# Patient Record
Sex: FEMALE | Race: BLACK OR AFRICAN AMERICAN | Employment: FULL TIME | URBAN - METROPOLITAN AREA
[De-identification: names, ages, dates, MRNs, and addresses within clinical notes are randomized per-mention and may not be internally consistent; named-entity substitution may affect disease eponyms.]

---

## 2018-03-20 ENCOUNTER — APPOINTMENT (OUTPATIENT)
Dept: CT IMAGING | Facility: CLINIC | Age: 43
End: 2018-03-20
Attending: PHYSICIAN ASSISTANT
Payer: OTHER GOVERNMENT

## 2018-03-20 ENCOUNTER — HOSPITAL ENCOUNTER (EMERGENCY)
Facility: CLINIC | Age: 43
Discharge: HOME | End: 2018-03-20
Attending: EMERGENCY MEDICINE | Admitting: EMERGENCY MEDICINE
Payer: OTHER GOVERNMENT

## 2018-03-20 VITALS
OXYGEN SATURATION: 99 % | DIASTOLIC BLOOD PRESSURE: 89 MMHG | HEART RATE: 76 BPM | RESPIRATION RATE: 22 BRPM | SYSTOLIC BLOOD PRESSURE: 132 MMHG | WEIGHT: 269 LBS | TEMPERATURE: 98.6 F

## 2018-03-20 DIAGNOSIS — R06.00 DYSPNEA, UNSPECIFIED TYPE: ICD-10-CM

## 2018-03-20 DIAGNOSIS — R79.89 ELEVATED D-DIMER: ICD-10-CM

## 2018-03-20 LAB
ANION GAP SERPL CALCULATED.3IONS-SCNC: 6 MMOL/L (ref 3–14)
BASOPHILS # BLD AUTO: 0 10E9/L (ref 0–0.2)
BASOPHILS NFR BLD AUTO: 0.6 %
BUN SERPL-MCNC: 11 MG/DL (ref 7–30)
CALCIUM SERPL-MCNC: 9.5 MG/DL (ref 8.5–10.1)
CHLORIDE SERPL-SCNC: 103 MMOL/L (ref 94–109)
CO2 SERPL-SCNC: 28 MMOL/L (ref 20–32)
CREAT SERPL-MCNC: 0.82 MG/DL (ref 0.52–1.04)
DIFFERENTIAL METHOD BLD: ABNORMAL
EOSINOPHIL # BLD AUTO: 0.1 10E9/L (ref 0–0.7)
EOSINOPHIL NFR BLD AUTO: 0.9 %
ERYTHROCYTE [DISTWIDTH] IN BLOOD BY AUTOMATED COUNT: 14 % (ref 10–15)
GFR SERPL CREATININE-BSD FRML MDRD: 77 ML/MIN/1.7M2
GLUCOSE SERPL-MCNC: 96 MG/DL (ref 70–99)
HCG SERPL QL: NEGATIVE
HCT VFR BLD AUTO: 40.7 % (ref 35–47)
HGB BLD-MCNC: 12.9 G/DL (ref 11.7–15.7)
IMM GRANULOCYTES # BLD: 0 10E9/L (ref 0–0.4)
IMM GRANULOCYTES NFR BLD: 0.3 %
LYMPHOCYTES # BLD AUTO: 2.3 10E9/L (ref 0.8–5.3)
LYMPHOCYTES NFR BLD AUTO: 32.9 %
MCH RBC QN AUTO: 26.4 PG (ref 26.5–33)
MCHC RBC AUTO-ENTMCNC: 31.7 G/DL (ref 31.5–36.5)
MCV RBC AUTO: 83 FL (ref 78–100)
MONOCYTES # BLD AUTO: 0.6 10E9/L (ref 0–1.3)
MONOCYTES NFR BLD AUTO: 8.8 %
NEUTROPHILS # BLD AUTO: 4 10E9/L (ref 1.6–8.3)
NEUTROPHILS NFR BLD AUTO: 56.5 %
NRBC # BLD AUTO: 0 10*3/UL
NRBC BLD AUTO-RTO: 0 /100
PLATELET # BLD AUTO: 223 10E9/L (ref 150–450)
POTASSIUM SERPL-SCNC: 4.2 MMOL/L (ref 3.4–5.3)
RBC # BLD AUTO: 4.89 10E12/L (ref 3.8–5.2)
SODIUM SERPL-SCNC: 137 MMOL/L (ref 133–144)
WBC # BLD AUTO: 7 10E9/L (ref 4–11)

## 2018-03-20 PROCEDURE — 84703 CHORIONIC GONADOTROPIN ASSAY: CPT | Performed by: PHYSICIAN ASSISTANT

## 2018-03-20 PROCEDURE — 99285 EMERGENCY DEPT VISIT HI MDM: CPT | Mod: 25

## 2018-03-20 PROCEDURE — 25000128 H RX IP 250 OP 636: Performed by: EMERGENCY MEDICINE

## 2018-03-20 PROCEDURE — 80048 BASIC METABOLIC PNL TOTAL CA: CPT | Performed by: PHYSICIAN ASSISTANT

## 2018-03-20 PROCEDURE — 96374 THER/PROPH/DIAG INJ IV PUSH: CPT | Mod: 59

## 2018-03-20 PROCEDURE — 93005 ELECTROCARDIOGRAM TRACING: CPT

## 2018-03-20 PROCEDURE — 85025 COMPLETE CBC W/AUTO DIFF WBC: CPT | Performed by: PHYSICIAN ASSISTANT

## 2018-03-20 PROCEDURE — 25000128 H RX IP 250 OP 636: Performed by: PHYSICIAN ASSISTANT

## 2018-03-20 PROCEDURE — 71260 CT THORAX DX C+: CPT

## 2018-03-20 RX ORDER — IOPAMIDOL 755 MG/ML
500 INJECTION, SOLUTION INTRAVASCULAR ONCE
Status: COMPLETED | OUTPATIENT
Start: 2018-03-20 | End: 2018-03-20

## 2018-03-20 RX ORDER — ONDANSETRON 2 MG/ML
4 INJECTION INTRAMUSCULAR; INTRAVENOUS ONCE
Status: COMPLETED | OUTPATIENT
Start: 2018-03-20 | End: 2018-03-20

## 2018-03-20 RX ADMIN — IOPAMIDOL 67 ML: 755 INJECTION, SOLUTION INTRAVENOUS at 19:35

## 2018-03-20 RX ADMIN — ONDANSETRON 4 MG: 2 INJECTION INTRAMUSCULAR; INTRAVENOUS at 20:05

## 2018-03-20 RX ADMIN — SODIUM CHLORIDE 81 ML: 9 INJECTION, SOLUTION INTRAVENOUS at 19:35

## 2018-03-20 ASSESSMENT — ENCOUNTER SYMPTOMS
COUGH: 0
CHEST TIGHTNESS: 0
DIAPHORESIS: 0
STRIDOR: 0
SHORTNESS OF BREATH: 1
NAUSEA: 1
WHEEZING: 0

## 2018-03-20 NOTE — ED PROVIDER NOTES
Emergency Department Attending Supervision Note  3/20/2018  5:44 PM      I evaluated this patient in conjunction with Latia Moise.      Briefly, the patient presented from clinic where she went today for routine physical where she was found to have an elevated d-dimer which was checked by PCP because the patient stated that she had SOB.      On my exam, patient is obese, but appears comfortable; lungs clear; no pleuritic chest pain; no lower extremity swelling/edema.    Results:  ECG:  Indication: abnormal labs, shortness of breath  Time: 1754  Vent. Rate 75 bpm. TX interval 174. QRS duration 72. QT/QTc 370/413. P-R-T axis 43 -15 32. Normal sinus rhythm. Normal ECG. Read time: 1800.    Imaging:  Radiographic findings were communicated with the patient who voiced understanding of the findings.    CT Abdomen/Pelvis w/o IV contrast-stone protocol:   1. No visualized pulmonary embolus.  2. No evidence of active pulmonary disease. As per radiology.     Laboratory:  BMP: All WNL (Creatinine: 0.82)  CBC: WBC: 7.0, HGB: 12.9, PLT: 223  HCG qualitative: Negative    Interventions:  2005 Zofran, 4 mg, IV injection    ED course:  Nursing notes and vitals reviewed.  I performed an exam of the patient as documented above.     IV inserted. Medicine administered as documented above. Blood drawn. This was sent to the lab for further testing, results above.    The patient was sent for a chest CT while in the emergency department, findings above.     Findings and plan explained to the Patient. Patient discharged home with instructions regarding supportive care, medications, and reasons to return. The importance of close follow-up was reviewed.     My impression is deconditioning and obesity.        Diagnosis:    SOB      Bryson Murphy MD Egger, Thomas W, MD  03/21/18 0811

## 2018-03-20 NOTE — ED AVS SNAPSHOT
[facility] Emergency Department    [address]    [facility] [address]    Phone:  [phone]    Fax:  [phone]                                       Teresa Sam   MRN: [id number]    Department:  [facility] Emergency Department   Date of Visit:  3/20/2018           Patient Information     Date Of Birth          1975        Your diagnoses for this visit were:     Dyspnea, unspecified type     Elevated d-dimer        You were seen by Bryson Murphy MD.      Follow-up Information     Follow up with Jenny Joseph NP In 3 days.    Specialty:  Nurse Practitioner    Why:  As needed    Contact information:    [facility] [place]  [address] [facility] [address] [phone]          Follow up with [facility] Emergency Department.    Specialty:  EMERGENCY MEDICINE    Why:  If symptoms worsen    Contact information:    [address]  [facility] [id number] [phone]        Discharge Instructions         Shortness of Breath (Dyspnea)  Shortness of breath is the feeling that you can't catch your breath or get enough air. It is also known as dyspnea.  Dyspnea can be caused by many different conditions. They include:    Acute asthma attack.    Worsening of chronic lung diseases such as chronic bronchitis and emphysema.    Heart failure. This is when weak heart muscle allows extra fluid to collect in the lungs.    Panic attacks or anxiety. Fear can cause rapid breathing (hyperventilation).    Pneumonia, or an infection in the lung tissue.    Exposure to toxic substances, fumes, smoke, or certain medicines.    Blood clot in the lung (pulmonary embolism). This is often from a piece of blood clot in a deep vein of the leg (deep vein thrombosis) that breaks off and travels to the lungs.    Heart attack or heart-related chest pain (angina).    Anemia.    Collapsed lung  (pneumothorax).    Dehydration.    Pregnancy.  Based on your visit today, the exact cause of your shortness of breath is not certain. Your tests don t show any of the serious causes of dyspnea. You may need other tests to find out if you have a serious problem. It s important to watch for any new symptoms or symptoms that get worse. Follow up with your healthcare provider as directed.  Home care  Follow these tips to take care of yourself at home:    When your symptoms are better, go back to your usual activities.    If you smoke, you should stop. Join a quit-smoking program or ask your healthcare provider for help.    Eat a healthy diet and get plenty of sleep.    Get regular exercise. Talk with your healthcare provider before starting to exercise, especially if you have other medical problems.    Cut down on the amount of caffeine and stimulants you consume.  Follow-up care  Follow up with your healthcare provider, or as advised.  If tests were done, you will be told if your treatment needs to be changed. You can call as directed for the results.  (Note: If an X-ray was taken, a specialist will review it. You will be notified of any new findings that may affect your care.)  Call 911 or get immediate medical care  Shortness of breath may be a sign of a serious medical problem. For example, it may be a problem with your heart or lungs. Call 911 if you have worsening shortness of breath or trouble breathing, especially with any of the symptoms below:    You are confused or it s difficult to wake you.    You faint or lose consciousness.    You have a fast heartbeat, or your heartbeat is irregular.    You are coughing up blood.    You have pain in your chest, arm, shoulder, neck, or upper back.    You break out in a sweat.  When to seek medical advice  Call your healthcare provider right away if any of these occur:    Slight shortness of breath or wheezing    Redness, pain or swelling in your leg, arm, or other body  area    Swelling in both legs or ankles    Fast weight gain    Dizziness or weakness    Fever of 100.4 F (38 C) or higher, or as directed by your healthcare provider        24 Hour Appointment Hotline       To make an appointment at any [facility] clinic, call [phone] ([phone]). If you don't have a family doctor or clinic, we will help you find one. [facility] are conveniently located to serve the needs of you and your family.             Review of your medicines      Our records show that you are taking the medicines listed below. If these are incorrect, please call your family doctor or clinic.        Dose / Directions Last dose taken    ASPIRIN PO   Dose:  81 mg        Take 81 mg by mouth daily   Refills:  0        METFORMIN HCL PO   Dose:  1000 mg        Take 1,000 mg by mouth 2 times daily (with meals)   Refills:  0        RANITIDINE HCL PO        Refills:  0        ZOLOFT PO   Dose:  150 mg        Take 150 mg by mouth daily   Refills:  0                Procedures and tests performed during your visit     Basic metabolic panel    CBC + differential    CT Chest Pulmonary Embolism w Contrast    EKG 12 lead    HCG qualitative      Orders Needing Specimen Collection     None      Pending Results     Date and Time Order Name Status Description    3/20/2018 1746 EKG 12 lead Preliminary             Pending Culture Results     No orders found from 3/18/2018 to 3/21/2018.            Pending Results Instructions     If you had any lab results that were not finalized at the time of your Discharge, you can call the ED Lab Result RN at [phone]. You will be contacted by this team for any positive Lab results or changes in treatment. The nurses are available 7 days a week from 10A to 6:30P.  You can leave a message 24 hours per day and they will return your call.        Test Results From Your Hospital Stay        3/20/2018  6:19 PM      Component Results     Component Value Ref Range & Units Status     Sodium 137 133 - 144 mmol/L Final    Potassium 4.2 3.4 - 5.3 mmol/L Final    Chloride 103 94 - 109 mmol/L Final    Carbon Dioxide 28 20 - 32 mmol/L Final    Anion Gap 6 3 - 14 mmol/L Final    Glucose 96 70 - 99 mg/dL Final    Urea Nitrogen 11 7 - 30 mg/dL Final    Creatinine 0.82 0.52 - 1.04 mg/dL Final    GFR Estimate 77 >60 mL/min/1.7m2 Final    Non  GFR Calc    GFR Estimate If Black >90 >60 mL/min/1.7m2 Final    African American GFR Calc    Calcium 9.5 8.5 - 10.1 mg/dL Final         3/20/2018  6:02 PM      Component Results     Component Value Ref Range & Units Status    WBC 7.0 4.0 - 11.0 10e9/L Final    RBC Count 4.89 3.8 - 5.2 10e12/L Final    Hemoglobin 12.9 11.7 - 15.7 g/dL Final    Hematocrit 40.7 35.0 - 47.0 % Final    MCV 83 78 - 100 fl Final    MCH 26.4 (L) 26.5 - 33.0 pg Final    MCHC 31.7 31.5 - 36.5 g/dL Final    RDW 14.0 10.0 - 15.0 % Final    Platelet Count 223 150 - 450 10e9/L Final    Diff Method Automated Method  Final    % Neutrophils 56.5 % Final    % Lymphocytes 32.9 % Final    % Monocytes 8.8 % Final    % Eosinophils 0.9 % Final    % Basophils 0.6 % Final    % Immature Granulocytes 0.3 % Final    Nucleated RBCs 0 0 /100 Final    Absolute Neutrophil 4.0 1.6 - 8.3 10e9/L Final    Absolute Lymphocytes 2.3 0.8 - 5.3 10e9/L Final    Absolute Monocytes 0.6 0.0 - 1.3 10e9/L Final    Absolute Eosinophils 0.1 0.0 - 0.7 10e9/L Final    Absolute Basophils 0.0 0.0 - 0.2 10e9/L Final    Abs Immature Granulocytes 0.0 0 - 0.4 10e9/L Final    Absolute Nucleated RBC 0.0  Final         3/20/2018  7:56 PM      Narrative     CT CHEST WITH CONTRAST   3/20/2018 7:43 PM     HISTORY: Dyspnea. Chest pain. Elevated D-dimer.    COMPARISON: None.    TECHNIQUE: Following the uneventful administration of 67 mL Isovue-370  intravenous contrast, helical sections were acquired through the lungs  according to the pulmonary embolism protocol. Coronal reconstructions  were generated. Radiation dose for this scan  was reduced using  automated exposure control, adjustment of the mA and/or kV according  to the patient's size, or iterative reconstruction technique.    FINDINGS: No visualized pulmonary embolus. The thoracic aorta is  normal in caliber without dissection.    Subsegmental atelectasis scattered within both lungs. The lungs are  otherwise clear. No pleural or pericardial effusion. No enlarged lymph  nodes in the chest. Small hiatal hernia.    Scan through the upper abdomen is unremarkable.        Impression     IMPRESSION:   1. No visualized pulmonary embolus.  2. No evidence of active pulmonary disease.    JENIFFER JERNIGAN MD         3/20/2018  7:01 PM      Component Results     Component Value Ref Range & Units Status    HCG Qualitative Serum Negative NEG^Negative Final    This test is for screening purposes.  Results should be interpreted along with   the clinical picture.  Confirmation testing is available if warranted by   ordering BBA090, HCG Quantitative Pregnancy.                  Clinical Quality Measure: Blood Pressure Screening     Your blood pressure was checked while you were in the emergency department today. The last reading we obtained was  BP: 148/88 . Please read the guidelines below about what these numbers mean and what you should do about them.  If your systolic blood pressure (the top number) is less than 120 and your diastolic blood pressure (the bottom number) is less than 80, then your blood pressure is normal. There is nothing more that you need to do about it.  If your systolic blood pressure (the top number) is 120-139 or your diastolic blood pressure (the bottom number) is 80-89, your blood pressure may be higher than it should be. You should have your blood pressure rechecked within a year by a primary care provider.  If your systolic blood pressure (the top number) is 140 or greater or your diastolic blood pressure (the bottom number) is 90 or greater, you may have high blood pressure.  "High blood pressure is treatable, but if left untreated over time it can put you at risk for heart attack, stroke, or kidney failure. You should have your blood pressure rechecked by a primary care provider within the next 4 weeks.  If your provider in the emergency department today gave you specific instructions to follow-up with your doctor or provider even sooner than that, you should follow that instruction and not wait for up to 4 weeks for your follow-up visit.        Thank you for choosing [facility]       Thank you for choosing [facility] for your care. Our goal is always to provide you with excellent care. Hearing back from our patients is one way we can continue to improve our services. Please take a few minutes to complete the written survey that you may receive in the mail after you visit with us. Thank you!        [facility] Information     [facility] lets you send messages to your doctor, view your test results, renew your prescriptions, schedule appointments and more. To sign up, go to [email or web address] . Click on \"Log in\" on the left side of the screen, which will take you to the Welcome page. Then click on \"Sign up Now\" on the right side of the page.     You will be asked to enter the access code listed below, as well as some personal information. Please follow the directions to create your username and password.     Your access code is: [email or web address]  Expires: 2018  8:10 PM     Your access code will  in 90 days. If you need help or a new code, please call your [facility] clinic or [phone].        Care EveryWhere ID     This is your Care EveryWhere ID. This could be used by other organizations to access your [facility] medical records  [id number]        Equal Access to Services     BRYCE STORY : Hadaixa Stephens, aron covarrubias, marsha levy. So [facility] [phone].    ATENCIÓN: Si habla español, tiene a montelongo disposición " servicios gratuitos de asistencia lingüística. Edd viera [phone].    We comply with applicable federal civil rights laws and [place] laws. We do not discriminate on the basis of race, color, national origin, age, disability, sex, sexual orientation, or gender identity.            After Visit Summary       This is your record. Keep this with you and show to your community pharmacist(s) and doctor(s) at your next visit.

## 2018-03-20 NOTE — ED AVS SNAPSHOT
[facility] Emergency Department    [address]    [facility] [address]    Phone:  [phone]    Fax:  [phone]                                       Teresa Sam   MRN: [id number]    Department:  [facility] Emergency Department   Date of Visit:  3/20/2018           After Visit Summary Signature Page     I have received my discharge instructions, and my questions have been answered. I have discussed any challenges I see with this plan with the nurse or doctor.    ..........................................................................................................................................  Patient/Patient Representative Signature      ..........................................................................................................................................  Patient Representative Print Name and Relationship to Patient    ..................................................               ................................................  Date                                            Time    ..........................................................................................................................................  Reviewed by Signature/Title    ...................................................              ..............................................  Date                                                            Time

## 2018-03-20 NOTE — ED PROVIDER NOTES
History     Chief Complaint:  Abnormal Labs and Shortness of Breath    HPI   Teresa Sam is a 42 year old female who presents with a one-week history of shortness of breath. The patient mentions that the shortness of breath happens mostly at rest, very intermittently, and rarely during exertion, such as when she is at the gym. She denies any chest pain or pain/swelling in her legs. The patient was seen by her primary care provider yesterday for a yearly physical, where she told the provider about her shortness of breath. The provider sunil a d-dimer, which was elevated at 0.55, and the patient was called today and told to go to the emergency department. She also admits to a slight amount of nausea without vomiting.     PE/DVT RISK FACTORS:   Sex: Female  Hormones: Negative  Tobacco: Negative  Cancer: Negative  Travel: Negative  Surgery: Negative  Other immobilization: Negative  Personal history: Positive - DVT in 2013  Family history: Negative.     Lab results from [facility], 03/19/2018:   Hgb A1c: 5.6  D-Dimer: 0.55 (H)    Allergies:  Latex     Medications:    Zoloft  Ranitdine  Aspirin  Metformin    Past Medical History:    Arthritis   Depression  GERD  PCOS    Past Surgical History:    cholecystectomy  Gyn surgery    Family History:    No past pertinent family history.    Social History:  Negative for tobacco use.  Positive for alcohol use: occasionally     Review of Systems   Constitutional: Negative for diaphoresis.   Respiratory: Positive for shortness of breath. Negative for cough, chest tightness, wheezing and stridor.    Gastrointestinal: Positive for nausea.   Neurological: Negative for syncope.   All other systems reviewed and are negative.    Physical Exam   Vitals:   Patient Vitals for the past 24 hrs:   BP Temp Temp src Pulse Heart Rate Resp SpO2 Weight   03/20/18 2020 132/89 - - 76 - - 99 % -   03/20/18 1830 - - - - - - 98 % -   03/20/18 1815 - - - - - - 97 % -   03/20/18 1734 - - -  - - - 98 % -   03/20/18 1730 - - - - - - 98 % -   03/20/18 1715 - - - - - - 98 % -   03/20/18 1710 148/88 98.6  F (37  C) Oral 78 78 22 99 % 122 kg (269 lb)       Physical Exam  General: Alert and interactive. Patient appears comfortable.   Eyes: The pupils are equal and round. EOMs intact. No scleral icterus.     Neck:Trachea is in the midline.        CV: Regular rate and rhythm. S1 and S2 normal without murmur, click, gallop or rub. No obvious swelling in calves. No tenderness to palpation of calves, bilaterally.   Resp: Breath sounds are clear bilaterally, without rhonchi, wheezes, rales. Non-labored, no retractions or accessory muscle use.      GI: Abdomen is soft without distension. No tenderness to palpation. No peritoneal signs.    MS: Moving all extremities well.   Skin:  Warm and dry. No rash or lesions noted.  Neuro:  Alert and oriented x 3. GCS 15.    Psych: Awake. Alert.  Normal affect. Appropriate interactions.  Lymph: No anterior or posterior cervical lymphadenopathy noted.    Emergency Department Course   ECG:  Indication: abnormal labs, shortness of breath  Time: 1754  Vent. Rate 75 bpm. MT interval 174. QRS duration 72. QT/QTc 370/413. P-R-T axis 43 -15 32. Normal sinus rhythm. Normal ECG. Read time: 1800.    Imaging:  Radiographic findings were communicated with the patient who voiced understanding of the findings.    CT Abdomen/Pelvis w/o IV contrast-stone protocol:   1. No visualized pulmonary embolus.  2. No evidence of active pulmonary disease. As per radiology.     Laboratory:  BMP: All WNL (Creatinine: 0.82)  CBC: WBC: 7.0, HGB: 12.9, PLT: 223  HCG qualitative: Negative    Interventions:  2005 Zofran, 4 mg, IV injection    Emergency Department Course:  Nursing notes and vitals reviewed.  I performed an exam of the patient as documented above.     IV inserted. Medicine administered as documented above. Blood drawn. This was sent to the lab for further testing, results above.    EKG obtained in  the ED, see results above.     The patient was sent for a chest CT-PE protocol while in the emergency department, findings above.     2001 I rechecked the patient and discussed the results of her workup thus far.     Findings and plan explained to the Patient. Patient discharged home with instructions regarding supportive care, medications, and reasons to return. The importance of close follow-up was reviewed.    I personally reviewed the laboratory results with the Patient and answered all related questions prior to discharge.     Impression & Plan    Medical Decision Making: Teresa Sam is a 42 year old female who presents for evaluation of dyspnea after being sent from the clinic for elevation of a d-dimer.  On exam, patient is pleasant, without signs of respiratory distress or swelling in her legs. Her vitals were stable initially, except for an initial respiratory rate of 22. I considered a broad differential of her dyspnea, including ACS, PE, asthma, pneumonia, and pneumothorax, among others.     ACS is unlikely given lack of chest pain. Initial EKG showed normal sinus rhythm, without ST segment elevation or depression to suggest ischemia. CT PE showed no signs of intrapulmonary pathology, including PE. I doubt asthma, giving lack of history and no wheezing on exam. Pneumonia and pneumothorax would have been visible on CT scan. She has no infectious symptoms, such as fever or chills, and I find pneumonia or another infectious etiology unlikely.  I explained that the dyspnea may be related to weight, and the patient should continue exercising regularly and consider close follow up with her PCP. I also explained that the d-dimer test is not sensitive and may have been elevated for various reasons. She feels reassured that various emergency pathologies have been ruled out. At this point, I feel as if patient is safe for discharge with close PCP follow up. All of patient's questions were answered prior  to discharge.     Diagnosis:    ICD-10-CM    1. Dyspnea, unspecified type R06.00    2. Elevated d-dimer R79.89        Disposition: Discharged to home.     I, Latia Moise PA-C, interviewed the patient, explained the course of action and discussed the patient with Dr. Murphy, who then evaluated the patient.    I, Germania Craig, am serving as a scribe on 3/20/2018 at 5:29 PM to personally document services performed by Latia Moise PA-C based on my observations and the provider's statements to me.     Germania Craig  3/20/2018   [facility] EMERGENCY DEPARTMENT       Latia Moise PA-C  03/20/18 2213       Bryson Murphy MD  03/21/18 0819

## 2018-03-20 NOTE — ED NOTES
Pt arrives with intermittent SOB, saw PCP today and had labs drawn, was called today with results and was told her d-dimer was elevated. ABCs intact, A/Ox4.

## 2018-03-21 LAB — INTERPRETATION ECG - MUSE: NORMAL

## 2018-03-21 NOTE — DISCHARGE INSTRUCTIONS
Shortness of Breath (Dyspnea)  Shortness of breath is the feeling that you can't catch your breath or get enough air. It is also known as dyspnea.  Dyspnea can be caused by many different conditions. They include:    Acute asthma attack.    Worsening of chronic lung diseases such as chronic bronchitis and emphysema.    Heart failure. This is when weak heart muscle allows extra fluid to collect in the lungs.    Panic attacks or anxiety. Fear can cause rapid breathing (hyperventilation).    Pneumonia, or an infection in the lung tissue.    Exposure to toxic substances, fumes, smoke, or certain medicines.    Blood clot in the lung (pulmonary embolism). This is often from a piece of blood clot in a deep vein of the leg (deep vein thrombosis) that breaks off and travels to the lungs.    Heart attack or heart-related chest pain (angina).    Anemia.    Collapsed lung (pneumothorax).    Dehydration.    Pregnancy.  Based on your visit today, the exact cause of your shortness of breath is not certain. Your tests don t show any of the serious causes of dyspnea. You may need other tests to find out if you have a serious problem. It s important to watch for any new symptoms or symptoms that get worse. Follow up with your healthcare provider as directed.  Home care  Follow these tips to take care of yourself at home:    When your symptoms are better, go back to your usual activities.    If you smoke, you should stop. Join a quit-smoking program or ask your healthcare provider for help.    Eat a healthy diet and get plenty of sleep.    Get regular exercise. Talk with your healthcare provider before starting to exercise, especially if you have other medical problems.    Cut down on the amount of caffeine and stimulants you consume.  Follow-up care  Follow up with your healthcare provider, or as advised.  If tests were done, you will be told if your treatment needs to be changed. You can call as directed for the results.  (Note:  If an X-ray was taken, a specialist will review it. You will be notified of any new findings that may affect your care.)  Call 911 or get immediate medical care  Shortness of breath may be a sign of a serious medical problem. For example, it may be a problem with your heart or lungs. Call 911 if you have worsening shortness of breath or trouble breathing, especially with any of the symptoms below:    You are confused or it s difficult to wake you.    You faint or lose consciousness.    You have a fast heartbeat, or your heartbeat is irregular.    You are coughing up blood.    You have pain in your chest, arm, shoulder, neck, or upper back.    You break out in a sweat.  When to seek medical advice  Call your healthcare provider right away if any of these occur:    Slight shortness of breath or wheezing    Redness, pain or swelling in your leg, arm, or other body area    Swelling in both legs or ankles    Fast weight gain    Dizziness or weakness    Fever of 100.4 F (38 C) or higher, or as directed by your healthcare provider

## 2018-03-26 ENCOUNTER — HEALTH MAINTENANCE LETTER (OUTPATIENT)
Age: 43
End: 2018-03-26

## 2020-09-01 ENCOUNTER — HOSPITAL ENCOUNTER (EMERGENCY)
Facility: CLINIC | Age: 45
Discharge: HOME | End: 2020-09-01
Attending: EMERGENCY MEDICINE | Admitting: EMERGENCY MEDICINE
Payer: OTHER GOVERNMENT

## 2020-09-01 ENCOUNTER — APPOINTMENT (OUTPATIENT)
Dept: CT IMAGING | Facility: CLINIC | Age: 45
End: 2020-09-01
Attending: EMERGENCY MEDICINE
Payer: OTHER GOVERNMENT

## 2020-09-01 VITALS
OXYGEN SATURATION: 100 % | SYSTOLIC BLOOD PRESSURE: 127 MMHG | HEART RATE: 75 BPM | TEMPERATURE: 98.1 F | DIASTOLIC BLOOD PRESSURE: 81 MMHG | WEIGHT: 254 LBS | RESPIRATION RATE: 16 BRPM

## 2020-09-01 DIAGNOSIS — R10.32 ABDOMINAL PAIN, LEFT LOWER QUADRANT: ICD-10-CM

## 2020-09-01 DIAGNOSIS — K59.00 CONSTIPATION, UNSPECIFIED CONSTIPATION TYPE: ICD-10-CM

## 2020-09-01 LAB
ALBUMIN UR-MCNC: NEGATIVE MG/DL
ANION GAP SERPL CALCULATED.3IONS-SCNC: 5 MMOL/L (ref 3–14)
APPEARANCE UR: CLEAR
B-HCG FREE SERPL-ACNC: <5 IU/L
BACTERIA #/AREA URNS HPF: ABNORMAL /HPF
BASOPHILS # BLD AUTO: 0.1 10E9/L (ref 0–0.2)
BASOPHILS NFR BLD AUTO: 0.7 %
BILIRUB UR QL STRIP: NEGATIVE
BUN SERPL-MCNC: 8 MG/DL (ref 7–30)
CALCIUM SERPL-MCNC: 8.7 MG/DL (ref 8.5–10.1)
CHLORIDE SERPL-SCNC: 110 MMOL/L (ref 94–109)
CO2 SERPL-SCNC: 24 MMOL/L (ref 20–32)
COLOR UR AUTO: ABNORMAL
CREAT BLD-MCNC: 0.9 MG/DL (ref 0.52–1.04)
CREAT SERPL-MCNC: 1 MG/DL (ref 0.52–1.04)
DIFFERENTIAL METHOD BLD: ABNORMAL
EOSINOPHIL # BLD AUTO: 0.1 10E9/L (ref 0–0.7)
EOSINOPHIL NFR BLD AUTO: 1 %
ERYTHROCYTE [DISTWIDTH] IN BLOOD BY AUTOMATED COUNT: 13.5 % (ref 10–15)
GFR SERPL CREATININE-BSD FRML MDRD: 68 ML/MIN/{1.73_M2}
GFR SERPL CREATININE-BSD FRML MDRD: 69 ML/MIN/{1.73_M2}
GLUCOSE SERPL-MCNC: 101 MG/DL (ref 70–99)
GLUCOSE UR STRIP-MCNC: NEGATIVE MG/DL
HCT VFR BLD AUTO: 41 % (ref 35–47)
HGB BLD-MCNC: 12.3 G/DL (ref 11.7–15.7)
HGB UR QL STRIP: NEGATIVE
IMM GRANULOCYTES # BLD: 0 10E9/L (ref 0–0.4)
IMM GRANULOCYTES NFR BLD: 0.1 %
KETONES UR STRIP-MCNC: NEGATIVE MG/DL
LEUKOCYTE ESTERASE UR QL STRIP: NEGATIVE
LYMPHOCYTES # BLD AUTO: 1.8 10E9/L (ref 0.8–5.3)
LYMPHOCYTES NFR BLD AUTO: 26.3 %
MCH RBC QN AUTO: 27.2 PG (ref 26.5–33)
MCHC RBC AUTO-ENTMCNC: 30 G/DL (ref 31.5–36.5)
MCV RBC AUTO: 91 FL (ref 78–100)
MONOCYTES # BLD AUTO: 0.5 10E9/L (ref 0–1.3)
MONOCYTES NFR BLD AUTO: 7.5 %
NEUTROPHILS # BLD AUTO: 4.3 10E9/L (ref 1.6–8.3)
NEUTROPHILS NFR BLD AUTO: 64.4 %
NITRATE UR QL: NEGATIVE
NRBC # BLD AUTO: 0 10*3/UL
NRBC BLD AUTO-RTO: 0 /100
PH UR STRIP: 6 PH (ref 5–7)
PLATELET # BLD AUTO: 153 10E9/L (ref 150–450)
POTASSIUM SERPL-SCNC: 3.9 MMOL/L (ref 3.4–5.3)
RBC # BLD AUTO: 4.53 10E12/L (ref 3.8–5.2)
RBC #/AREA URNS AUTO: <1 /HPF (ref 0–2)
SODIUM SERPL-SCNC: 139 MMOL/L (ref 133–144)
SOURCE: ABNORMAL
SP GR UR STRIP: 1 (ref 1–1.03)
SQUAMOUS #/AREA URNS AUTO: 1 /HPF (ref 0–1)
UROBILINOGEN UR STRIP-MCNC: NORMAL MG/DL (ref 0–2)
WBC # BLD AUTO: 6.8 10E9/L (ref 4–11)
WBC #/AREA URNS AUTO: <1 /HPF (ref 0–5)

## 2020-09-01 PROCEDURE — 84702 CHORIONIC GONADOTROPIN TEST: CPT

## 2020-09-01 PROCEDURE — 99285 EMERGENCY DEPT VISIT HI MDM: CPT | Mod: 25

## 2020-09-01 PROCEDURE — 80048 BASIC METABOLIC PNL TOTAL CA: CPT | Performed by: EMERGENCY MEDICINE

## 2020-09-01 PROCEDURE — 25000128 H RX IP 250 OP 636: Performed by: EMERGENCY MEDICINE

## 2020-09-01 PROCEDURE — 25000132 ZZH RX MED GY IP 250 OP 250 PS 637: Performed by: EMERGENCY MEDICINE

## 2020-09-01 PROCEDURE — 85025 COMPLETE CBC W/AUTO DIFF WBC: CPT | Performed by: EMERGENCY MEDICINE

## 2020-09-01 PROCEDURE — 82565 ASSAY OF CREATININE: CPT | Mod: 91

## 2020-09-01 PROCEDURE — 81001 URINALYSIS AUTO W/SCOPE: CPT | Performed by: EMERGENCY MEDICINE

## 2020-09-01 PROCEDURE — 74177 CT ABD & PELVIS W/CONTRAST: CPT

## 2020-09-01 PROCEDURE — 25000125 ZZHC RX 250: Performed by: EMERGENCY MEDICINE

## 2020-09-01 RX ORDER — POLYETHYLENE GLYCOL 3350 17 G/17G
1 POWDER, FOR SOLUTION ORAL DAILY
Qty: 527 G | Refills: 0 | Status: SHIPPED | OUTPATIENT
Start: 2020-09-01 | End: 2020-10-01

## 2020-09-01 RX ORDER — IOPAMIDOL 755 MG/ML
100 INJECTION, SOLUTION INTRAVASCULAR ONCE
Status: COMPLETED | OUTPATIENT
Start: 2020-09-01 | End: 2020-09-01

## 2020-09-01 RX ADMIN — SODIUM CHLORIDE 65 ML: 9 INJECTION, SOLUTION INTRAVENOUS at 09:37

## 2020-09-01 RX ADMIN — IOPAMIDOL 100 ML: 755 INJECTION, SOLUTION INTRAVENOUS at 09:37

## 2020-09-01 RX ADMIN — DOCUSATE SODIUM 286 ML: 50 LIQUID ORAL at 10:06

## 2020-09-01 NOTE — ED PROVIDER NOTES
History   Chief Complaint:  Abdominal Pain     HPI   Teresa Sam is a 45-year-old female with a past medical history significant for status post  who presents to the emergency department with a chief complaint of left lower quadrant pain radiating to left back, associated constipation onset approximately 3 days ago.  Patient denies specific alleviating or denies any vaginal bleeding, changes in urination, vaginal bleeding, chest pain, shortness of breath, fevers, chills.  Patient reports that she has a fair amount of rectal pressure, feels like she has no bowel movement and cannot.     She denies alcohol, illicit drugs, tobacco.    Allergies:  Latex    Medications:   Aspirin PO  Metformin HCl  Ranitidine  Sertraline HCl    Past Medical History:    Arthritis   Depressive disorder  Anxiety  Gastroesophageal reflux disease   DVT  External hemorrhoids  Sleep apnea  PCOS  Extreme obesity      Past Surgical History:    Cholecystectomy   GYN surgery      Family History:    No past pertinent family history.    Social History:  Smoking Status: Never Smoker  Smokeless Tobacco: Never Used  Alcohol Use: No  Drug Use: No  PCP: Jenny Joseph     Review of Systems  Full ROS completed and negative other than pertinent positives and negatives noted in HPI    Physical Exam     Patient Vitals for the past 24 hrs:   BP Temp Temp src Pulse Resp SpO2 Weight   20 0633 127/81 98.1  F (36.7  C) Oral 75 16 100 % 115.2 kg (254 lb)       Physical Exam  Constitutional: Well developed, nontox appearance  Head: Atraumatic.   Neck:  no stridor  Eyes: no scleral icterus  Cardiovascular: RRR, 2+ bilat radial, PT pulses  Pulmonary/Chest: nml resp effort  Abdominal: ND, soft, minimal reproducible left lower quadrant tenderness, no rebound or guarding   : no CVA tenderness bilat  Rectal: CRYSTAL w/o mass or large amount of stool in rectal vault (s/p enema)  Ext: Warm, well perfused, no edema  Neurological: A&O,  symmetric facies, moves ext x4  Skin: Skin is warm and dry.   Psychiatric: Behavior is normal. Thought content normal.   Nursing note and vitals reviewed.    Emergency Department Course   Imaging:  Radiology findings were communicated with the pt who voiced understanding of the findings.    CT Abdomen Pelvis w Contrast  IMPRESSION:   1.  No acute abnormality is seen.  2.  Prominent stool throughout the colon may relate to constipation.  3.  Fibroid uterus.  Reading per radiology     Laboratory:  Laboratory findings were communicated with the pt who voiced understanding of the findings.    UA with microscopic: bacteria few o/w WNL    ISTAT HCG qualitative: <5.0  Creatinine POCT: Creatinine 0.9, GFR estimate 68  CBC: WBC 6.8, HGB 12.3,   BMP: chloride 110(H), glucose 101(H) o/w WNL (Creatinine 1.00)    Interventions:  1006 Pink Lady Enema 286 mL Rectal     Emergency Department Course:  Nursing notes and vitals reviewed.    0645 I performed an exam of the patient as documented above.     IV was inserted and blood was drawn for laboratory testing, results above.    The patient provided a urine sample here in the emergency department. This was sent for laboratory testing, findings above.    The patient was sent for a CT Abdomen Pelvis while in the emergency department, results above.      1000 I rechecked the patient. Explained findings to the Patient.    1120 I returned to check on the patient. She is feeling improved and ready for discharge.     Findings and plan explained to the Patient. Patient discharged home with instructions regarding supportive care, medications, and reasons to return. The importance of close follow-up was reviewed. The patient was prescribed Golytely and Miralax.      Impression & Plan      Medical Decision Makin year old female presenting w/ left lower quadrant abdominal pain, constipation     DDx includes UTI, ureteral stone, constipation, abdominal pain NOS, lumbar disc uropathy,  diverticulitis, colitis, medication adverse reaction.  Patient declined pain medication in the emergency department.  Given patient's history and physical exam, I think would be appropriate to obtain a CT scan which was significant for findings consistent with constipation.  Doubt surgical etiology, UTI, cholecystitis given during work-up.  Labs unremarkable.  Given the emergency department with mild improvement in symptoms.  Given reassuring CT scan and work-up, at this time I feel the pt is safe for discharge.  Prescriptions written as noted below for outpatient management.  Recommendations given regarding follow up with PCP and return to the emergency department as needed for new or worsening symptoms.  Pt counseled on all results, disposition and diagnosis.  They are understanding and agreeable to plan. Patient discharged in stable condition.      Diagnosis:    ICD-10-CM    1. Constipation, unspecified constipation type  K59.00    2. Abdominal pain, left lower quadrant  R10.32        Disposition:   discharged to home    Discharge Medications:  New Prescriptions    POLYETHYLENE GLYCOL (GOLYTELY) 236 G SUSPENSION    Take 4,000 mLs (4 L) by mouth once for 1 dose    POLYETHYLENE GLYCOL (MIRALAX) 17 GM/DOSE POWDER    Take 17 g (1 capful) by mouth daily       Scribe Disclosure:  I, Domenicacristina Hagan, am serving as a scribe at 6:46 AM on 9/1/2020 to document services personally performed by Nabil Stuart MD based on my observations and the provider's statements to me.   [facility] EMERGENCY DEPARTMENT       Nabil Stuart MD  09/01/20 2855

## 2020-09-01 NOTE — ED AVS SNAPSHOT
[facility] Emergency Department  [address]  [facility] [address]  Phone:  [phone]  Fax:  [phone]                                    Teresa Sam   MRN: [id number]    Department:  [facility] Emergency Department   Date of Visit:  9/1/2020           After Visit Summary Signature Page    I have received my discharge instructions, and my questions have been answered. I have discussed any challenges I see with this plan with the nurse or doctor.    ..........................................................................................................................................  Patient/Patient Representative Signature      ..........................................................................................................................................  Patient Representative Print Name and Relationship to Patient    ..................................................               ................................................  Date                                   Time    ..........................................................................................................................................  Reviewed by Signature/Title    ...................................................              ..............................................  Date                                               Time          22EPIC Rev 08/18

## 2020-09-01 NOTE — DISCHARGE INSTRUCTIONS
Discharge Instructions  Constipation  Constipation can cause severe cramping pain and your provider thinks this might be the cause of your abdominal pain (belly pain) today.  People usually recognize that they are constipated because they have difficulty having bowel movements, are not having bowel movements frequently enough, or are not having large enough bowel movements. Sometimes, especially in children or older people, you do not recognize that you are constipated until it becomes severe. The most common causes of constipation are a lack of exercise and not eating enough fruits, vegetables, and whole grains. Constipation can also be a side effect of medications, such as narcotics, or may be caused by a disease of the digestive system.    Generally, every Emergency Department visit should have a follow-up clinic visit with either a primary or a specialty clinic/provider. Please follow-up as instructed by your emergency provider today. Sometimes, chronic constipation requires further testing to determine the cause. If you are over 50 years old, you may need a colonoscopy if you have not had one before.     Return to the Emergency Department if:  Your abdominal pain worsens or does not improve after a bowel movement.  You become very weak.  You get a temperature above 102oF or as directed by your provider.  You have blood in your stools (bright red or black, tarry stools).  You keep vomiting (throwing up) or cannot drink liquids.  Your see blood when you vomit.  Your stomach gets bloated or bigger.  You have new symptoms or anything that worries you.    What can I do to help myself?  If your provider gave you a cathartic medication, like magnesium citrate or GoLytely  (polyethylene glycol), you can expect to have cramps and gas pains after taking it. You can expect to have a number of bowel movements and even diarrhea (loose or watery stools) in the course of clearing your bowels.  You will know your bowels  have been cleaned out after you pass clear liquid. The cramps and gas should let up after you have emptied your bowels. You may want to wait until morning to take this type of medication so you aren t up in the night.   Sometimes instead of cathartics, we recommend laxatives like milk of magnesia to move your bowels more slowly, or an enema to help the bowels to move. Read and follow the package directions, or follow your provider s instructions.  Once you have become very constipated, it takes time for your bowels to return to normal and you need to be very careful to prevent becoming constipated again. Take a laxative if you do not move your bowels at least every two days.     Eat foods that have a lot of fiber. Good choices are fruits, vegetables, prune juice, apple juice, and high fiber cereal. Limit dairy products such as milk and cheese, since these can make constipation worse.   Drink plenty of water.   When you feel the need to go to the bathroom, go to the bathroom. Do not hold it.  Miralax , Metamucil , Colace , Senna or fiber supplements can be used daily.  Miralax  daily is often the best choice for children.  If you were given a prescription for medicine here today, be sure to read all of the information (including the package insert) that comes with your prescription.  This will include important information about the medicine, its side effects, and any warnings that you need to know about.  The pharmacist who fills the prescription can provide more information and answer questions you may have about the medicine.  If you have questions or concerns that the pharmacist cannot address, please call or return to the Emergency Department.   Remember that you can always come back to the Emergency Department if you are not able to see your regular provider in the amount of time listed above, if you get any new symptoms, or if there is anything that worries you.

## 2020-09-01 NOTE — ED NOTES
"Patient had bowel movement.  Patient states she feels better but not sure if she \"got everything out\".  MD notified, will continue to monitor.   "

## 2020-09-01 NOTE — ED TRIAGE NOTES
Pt reports 3 days of constipation, very hard stool and urge to push, feels like stool is in the rectal vault

## 2021-04-08 ENCOUNTER — APPOINTMENT (OUTPATIENT)
Dept: CT IMAGING | Facility: CLINIC | Age: 46
End: 2021-04-08
Attending: EMERGENCY MEDICINE
Payer: OTHER GOVERNMENT

## 2021-04-08 ENCOUNTER — HOSPITAL ENCOUNTER (EMERGENCY)
Facility: CLINIC | Age: 46
Discharge: HOME | End: 2021-04-08
Attending: EMERGENCY MEDICINE | Admitting: EMERGENCY MEDICINE
Payer: OTHER GOVERNMENT

## 2021-04-08 VITALS
DIASTOLIC BLOOD PRESSURE: 68 MMHG | TEMPERATURE: 98.5 F | RESPIRATION RATE: 20 BRPM | BODY MASS INDEX: 40.83 KG/M2 | SYSTOLIC BLOOD PRESSURE: 118 MMHG | HEART RATE: 90 BPM | HEIGHT: 68 IN | WEIGHT: 269.4 LBS | OXYGEN SATURATION: 98 %

## 2021-04-08 DIAGNOSIS — R10.12 LUQ ABDOMINAL PAIN: ICD-10-CM

## 2021-04-08 LAB
ALBUMIN SERPL-MCNC: 3.6 G/DL (ref 3.4–5)
ALBUMIN UR-MCNC: NEGATIVE MG/DL
ALP SERPL-CCNC: 79 U/L (ref 40–150)
ALT SERPL W P-5'-P-CCNC: 20 U/L (ref 0–50)
ANION GAP SERPL CALCULATED.3IONS-SCNC: 5 MMOL/L (ref 3–14)
APPEARANCE UR: CLEAR
AST SERPL W P-5'-P-CCNC: 18 U/L (ref 0–45)
BASOPHILS # BLD AUTO: 0.1 10E9/L (ref 0–0.2)
BASOPHILS NFR BLD AUTO: 0.7 %
BILIRUB SERPL-MCNC: 0.7 MG/DL (ref 0.2–1.3)
BILIRUB UR QL STRIP: NEGATIVE
BUN SERPL-MCNC: 15 MG/DL (ref 7–30)
CALCIUM SERPL-MCNC: 8.8 MG/DL (ref 8.5–10.1)
CHLORIDE SERPL-SCNC: 104 MMOL/L (ref 94–109)
CO2 SERPL-SCNC: 28 MMOL/L (ref 20–32)
COLOR UR AUTO: NORMAL
CREAT SERPL-MCNC: 0.94 MG/DL (ref 0.52–1.04)
DIFFERENTIAL METHOD BLD: ABNORMAL
EOSINOPHIL # BLD AUTO: 0.1 10E9/L (ref 0–0.7)
EOSINOPHIL NFR BLD AUTO: 1.2 %
ERYTHROCYTE [DISTWIDTH] IN BLOOD BY AUTOMATED COUNT: 12.7 % (ref 10–15)
GFR SERPL CREATININE-BSD FRML MDRD: 74 ML/MIN/{1.73_M2}
GLUCOSE SERPL-MCNC: 99 MG/DL (ref 70–99)
GLUCOSE UR STRIP-MCNC: NEGATIVE MG/DL
HCG SERPL QL: NEGATIVE
HCT VFR BLD AUTO: 42.5 % (ref 35–47)
HGB BLD-MCNC: 13.2 G/DL (ref 11.7–15.7)
HGB UR QL STRIP: NEGATIVE
IMM GRANULOCYTES # BLD: 0 10E9/L (ref 0–0.4)
IMM GRANULOCYTES NFR BLD: 0.2 %
INTERPRETATION ECG - MUSE: NORMAL
KETONES UR STRIP-MCNC: NEGATIVE MG/DL
LEUKOCYTE ESTERASE UR QL STRIP: NEGATIVE
LIPASE SERPL-CCNC: 456 U/L (ref 73–393)
LYMPHOCYTES # BLD AUTO: 2.2 10E9/L (ref 0.8–5.3)
LYMPHOCYTES NFR BLD AUTO: 24.6 %
MCH RBC QN AUTO: 27.7 PG (ref 26.5–33)
MCHC RBC AUTO-ENTMCNC: 31.1 G/DL (ref 31.5–36.5)
MCV RBC AUTO: 89 FL (ref 78–100)
MONOCYTES # BLD AUTO: 0.8 10E9/L (ref 0–1.3)
MONOCYTES NFR BLD AUTO: 8.6 %
NEUTROPHILS # BLD AUTO: 5.7 10E9/L (ref 1.6–8.3)
NEUTROPHILS NFR BLD AUTO: 64.7 %
NITRATE UR QL: NEGATIVE
NRBC # BLD AUTO: 0 10*3/UL
NRBC BLD AUTO-RTO: 0 /100
PH UR STRIP: 6.5 PH (ref 5–7)
PLATELET # BLD AUTO: 211 10E9/L (ref 150–450)
POTASSIUM SERPL-SCNC: 4 MMOL/L (ref 3.4–5.3)
PROT SERPL-MCNC: 7.7 G/DL (ref 6.8–8.8)
RBC # BLD AUTO: 4.76 10E12/L (ref 3.8–5.2)
SODIUM SERPL-SCNC: 137 MMOL/L (ref 133–144)
SOURCE: NORMAL
SP GR UR STRIP: 1.01 (ref 1–1.03)
UROBILINOGEN UR STRIP-MCNC: NORMAL MG/DL (ref 0–2)
WBC # BLD AUTO: 8.9 10E9/L (ref 4–11)

## 2021-04-08 PROCEDURE — 250N000011 HC RX IP 250 OP 636: Performed by: EMERGENCY MEDICINE

## 2021-04-08 PROCEDURE — 250N000009 HC RX 250: Performed by: EMERGENCY MEDICINE

## 2021-04-08 PROCEDURE — 83690 ASSAY OF LIPASE: CPT | Performed by: EMERGENCY MEDICINE

## 2021-04-08 PROCEDURE — 81003 URINALYSIS AUTO W/O SCOPE: CPT | Performed by: EMERGENCY MEDICINE

## 2021-04-08 PROCEDURE — 258N000003 HC RX IP 258 OP 636: Performed by: EMERGENCY MEDICINE

## 2021-04-08 PROCEDURE — 74177 CT ABD & PELVIS W/CONTRAST: CPT | Mod: 59

## 2021-04-08 PROCEDURE — 99285 EMERGENCY DEPT VISIT HI MDM: CPT | Mod: 25

## 2021-04-08 PROCEDURE — 85025 COMPLETE CBC W/AUTO DIFF WBC: CPT | Performed by: EMERGENCY MEDICINE

## 2021-04-08 PROCEDURE — 93005 ELECTROCARDIOGRAM TRACING: CPT

## 2021-04-08 PROCEDURE — 80053 COMPREHEN METABOLIC PANEL: CPT | Performed by: EMERGENCY MEDICINE

## 2021-04-08 PROCEDURE — 84703 CHORIONIC GONADOTROPIN ASSAY: CPT | Performed by: EMERGENCY MEDICINE

## 2021-04-08 PROCEDURE — 96360 HYDRATION IV INFUSION INIT: CPT

## 2021-04-08 RX ORDER — KETOROLAC TROMETHAMINE 15 MG/ML
15 INJECTION, SOLUTION INTRAMUSCULAR; INTRAVENOUS ONCE
Status: DISCONTINUED | OUTPATIENT
Start: 2021-04-08 | End: 2021-04-08 | Stop reason: HOSPADM

## 2021-04-08 RX ORDER — IOPAMIDOL 755 MG/ML
500 INJECTION, SOLUTION INTRAVASCULAR ONCE
Status: COMPLETED | OUTPATIENT
Start: 2021-04-08 | End: 2021-04-08

## 2021-04-08 RX ADMIN — SODIUM CHLORIDE 65 ML: 9 INJECTION, SOLUTION INTRAVENOUS at 08:17

## 2021-04-08 RX ADMIN — IOPAMIDOL 100 ML: 755 INJECTION, SOLUTION INTRAVENOUS at 08:17

## 2021-04-08 RX ADMIN — SODIUM CHLORIDE 1000 ML: 9 INJECTION, SOLUTION INTRAVENOUS at 06:42

## 2021-04-08 ASSESSMENT — ENCOUNTER SYMPTOMS
FREQUENCY: 0
CONSTIPATION: 1
NAUSEA: 0
DIARRHEA: 0
FEVER: 0
DYSURIA: 0
ABDOMINAL PAIN: 1
VOMITING: 0
HEMATURIA: 0

## 2021-04-08 ASSESSMENT — MIFFLIN-ST. JEOR: SCORE: 1925.5

## 2021-04-08 NOTE — DISCHARGE INSTRUCTIONS
What do you do next:   Continue your home medications unless we have specifically changed them  Try using the magnesium citrate that I have prescribed.  This medicine will likely make your abdomen cramp after you take it.  You should talk with your primary care clinic about decreasing the dose of or perhaps stopping Ozempic altogether.  I cannot say for sure that is the cause of your symptoms but it certainly could be a factor.  Follow up as indicated below    When do you return: If you have uncontrollable abdominal pain, intractable vomiting, inability to have a bowel movement, or any other symptoms that concern you, please return to the ED for reevaluation.    Thank you for allowing us to care for you today.

## 2021-04-08 NOTE — ED PROVIDER NOTES
"  History   Chief Complaint:  Abdominal Pain     The history is provided by the patient.      Teresa Sam is a 45 year old female who presents for evaluation of abdominal pain. Recently the patient has felt abnormally gassy and constipated and she has been taking stool softeners for this. This morning she awoke from sleep with new sharp left upper quadrant abdominal pain. This pain has been waxing and waning in severity since onset. Due to this new pain she came into the ED for evaluation. She has never had similar pain. She denies any recent fever, nausea, vomiting, diarrhea, dysuria, hematuria, or urinary frequency. She notes that she has been making dietary changes recently and has been using meal supplement protein shakes. She has no personal history of kidney stones.     Review of Systems   Constitutional: Negative for fever.   Gastrointestinal: Positive for abdominal pain (left upper quadrant) and constipation. Negative for diarrhea, nausea and vomiting.   Genitourinary: Negative for dysuria, frequency and hematuria.   All other systems reviewed and are negative.      Allergies:  No known drug allergies      Medications:  Aspirin   Metformin  Ranitidine  Zoloft     Past Medical History:    Arthritis  DVT  Depression  GERD   PCOS      Past Surgical History:    Cholecystectomy  Laparoscopy    section      Social History:  The patient presents to the ED alone.  Alcohol use: Occasional     Physical Exam     Patient Vitals for the past 24 hrs:   BP Temp Temp src Pulse Resp SpO2 Height Weight   21 0940 -- -- -- -- -- 98 % -- --   21 0930 -- -- -- -- -- 97 % -- --   21 0920 118/68 -- -- -- -- 95 % -- --   21 0800 107/67 -- -- -- -- 97 % -- --   21 0533 137/85 98.5  F (36.9  C) Oral 90 20 99 % 1.727 m (5' 8\") 122.2 kg (269 lb 6.4 oz)       Physical Exam  Constitutional: Vital signs reviewed as above.   HENT:               Head: No external signs of trauma noted.        "       Eyes: Conjunctivae are normal. Pupils are equal, round, and reactive to light.   Cardiovascular:               Normal rate, regular rhythm and normal heart sounds.                Exam reveals no friction rub.                No murmur heard.  Pulmonary/Chest:               Effort normal and breath sounds normal.               No respiratory distress.               There are no wheezes.               There are no rales.   Gastrointestinal:               Soft.               Bowel sounds normal.               There is no distension.               There is LUQ tenderness to palpation.              Palpation of the RUQ and LLQ towards the LUQ causes some pressure/pain in the LUQ              There is no rebound or guarding.   Musculoskeletal:               Normal range of motion.               Normal Tone              There is mild left CVA tenderness to percussion  Neurological: Patient is alert and oriented to person, place, and time.   Skin: Skin is warm and dry. Patient is not diaphoretic  Psychiatric: The patient appears calm     Emergency Department Course   ECG  ECG taken at 6:14:47, ECG read at 0630  Normal sinus rhythm, Normal ECG   No significant change as compared to prior, dated 3/20/2018.  Rate 80 bpm. AR interval 168 ms. QRS duration 74 ms. QT/QTc 378/435 ms. P-R-T axes 48 -7 31.     Imaging:  CT Abdomen Pelvis w Contrast:  IMPRESSION:   1.  Possible mild colonic constipation. No bowel obstruction, diverticulitis or appendicitis.   2.  Pancreas is unremarkable. Remaining abdominal organs are also within normal limits. No renal calculi or hydronephrosis. Tiny indeterminate low-attenuation liver lesion is stable. Cholecystectomy changes.   3.  Enlarging left fundal fibroid. Multiple additional uterine fibroids are present and otherwise unchanged. IUD remains in place.   Per radiology.      Laboratory:   CBC: WBC 8.9, HGB 13,2,   CMP: WNL (Creatinine 0.94)    Lipase: 456 high   HCG Qualitative  Serum: Negative   UA with microscopic: WNL       Emergency Department Course:  Reviewed:  I reviewed nursing notes, vitals and past medical history    Assessments:  0600: I obtained history and examined the patient as noted above.     ED Course as of Apr 08 1423   Thu Apr 08, 2021   0945 I updated and reassessed the patient.          Interventions:  0642 NS 1,000 mL IV     Disposition:  The patient was discharged to home.      Impression & Plan   CMS Diagnoses: None    Medical Decision Making:  This 45-year-old female patient presents to the ED due to abdominal pain.  Please see the HPI and exam for specifics.  Given the waxing and waning nature of her discomfort and some left CVA tenderness to percussion, I certainly had stone disease on my differential.  The patient is also on Ozempic and abdominal conditions such as pancreatitis were also possible.  Given the patient's normal-looking urinalysis, I elected to order a CT of the abdomen and pelvis with IV contrast.  Fortunately no specific abnormalities were noted.  The patient states she has been more constipated recently but did have a very small bowel movement yesterday and notes that she has been taking stool softeners and drinking much more water.  At this point, I believe she can be discharged.  I will write a prescription for magnesium citrate and encouraged her to follow [facility].  She states she had primarily been on Ozempic for weight loss and not diabetes.  Her dose was increased in February so it may be reasonable to decrease her dose or simply come off the medicine altogether given the slight elevation in her lipase.  Otherwise she may follow-up as noted and return to the ED with any new or worsening symptoms.  Anticipatory guidance given prior to discharge.     Diagnosis:    ICD-10-CM    1. LUQ abdominal pain  R10.12       Discharge Medications:  Discharge Medication List as of 4/8/2021 10:18 AM      START taking these  medications    Details   magnesium citrate solution Take 296 mLs by mouth once for 1 dose, Disp-296 mL, R-0, E-Prescribe              Scribe Disclosure:  I, Bk Miguel, am serving as a scribe at 6:00 AM on 4/8/2021 to document services personally performed by Kane Rodrigues DO based on my observations and the provider's statements to me.         Kane Rodrigues DO  04/08/21 1420

## 2021-04-08 NOTE — ED TRIAGE NOTES
Pt arrives with complaints of LUQ pain that woke her from sleep, also c/o constipation that she says is not necessarily new for her since starting Ozempic in Dec. to aid in weight loss. Denies nausea and vomiting. Also reports fairly major diet changes in the last week including an increase in meal supplement protein shakes. Denies even occasional ETOH use.  ABCs intact, A/O x4.

## 2021-07-18 ENCOUNTER — HOSPITAL ENCOUNTER (EMERGENCY)
Facility: CLINIC | Age: 46
Discharge: LEFT BEFORE DISCHARGE | End: 2021-07-18
Admitting: EMERGENCY MEDICINE
Payer: OTHER GOVERNMENT

## 2021-07-18 VITALS
HEART RATE: 80 BPM | SYSTOLIC BLOOD PRESSURE: 119 MMHG | OXYGEN SATURATION: 99 % | DIASTOLIC BLOOD PRESSURE: 98 MMHG | RESPIRATION RATE: 20 BRPM | TEMPERATURE: 98.2 F

## 2021-07-18 LAB
ALBUMIN UR-MCNC: 30 MG/DL
APPEARANCE UR: ABNORMAL
BACTERIA #/AREA URNS HPF: ABNORMAL /HPF
BILIRUB UR QL STRIP: NEGATIVE
COLOR UR AUTO: ABNORMAL
GLUCOSE UR STRIP-MCNC: NEGATIVE MG/DL
HGB UR QL STRIP: ABNORMAL
KETONES UR STRIP-MCNC: NEGATIVE MG/DL
LEUKOCYTE ESTERASE UR QL STRIP: ABNORMAL
NITRATE UR QL: NEGATIVE
PH UR STRIP: 6 [PH] (ref 5–7)
RBC URINE: >182 /HPF
SP GR UR STRIP: 1.01 (ref 1–1.03)
SQUAMOUS EPITHELIAL: 1 /HPF
TRANSITIONAL EPI: 2 /HPF
UROBILINOGEN UR STRIP-MCNC: NORMAL MG/DL
WBC URINE: 118 /HPF

## 2021-07-18 PROCEDURE — 87086 URINE CULTURE/COLONY COUNT: CPT | Performed by: EMERGENCY MEDICINE

## 2021-07-18 PROCEDURE — 999N000104 HC STATISTIC NO CHARGE

## 2021-07-18 PROCEDURE — 81001 URINALYSIS AUTO W/SCOPE: CPT | Performed by: EMERGENCY MEDICINE

## 2021-07-18 NOTE — ED TRIAGE NOTES
Pt states dysuria and hematuria tonight. Pt states recently finished 5 day course of Macrobid for UTI. ABCs intact GCS 15

## 2021-07-19 LAB — BACTERIA UR CULT: ABNORMAL

## 2021-08-15 ENCOUNTER — HOSPITAL ENCOUNTER (EMERGENCY)
Facility: CLINIC | Age: 46
Discharge: HOME | End: 2021-08-15
Attending: EMERGENCY MEDICINE | Admitting: EMERGENCY MEDICINE
Payer: OTHER GOVERNMENT

## 2021-08-15 ENCOUNTER — APPOINTMENT (OUTPATIENT)
Dept: CT IMAGING | Facility: CLINIC | Age: 46
End: 2021-08-15
Attending: EMERGENCY MEDICINE
Payer: OTHER GOVERNMENT

## 2021-08-15 VITALS
SYSTOLIC BLOOD PRESSURE: 132 MMHG | TEMPERATURE: 98.2 F | DIASTOLIC BLOOD PRESSURE: 78 MMHG | RESPIRATION RATE: 16 BRPM | OXYGEN SATURATION: 100 % | HEART RATE: 73 BPM

## 2021-08-15 DIAGNOSIS — R19.7 DIARRHEA, UNSPECIFIED TYPE: ICD-10-CM

## 2021-08-15 LAB
ALBUMIN SERPL-MCNC: 3.7 G/DL (ref 3.4–5)
ALP SERPL-CCNC: 52 U/L (ref 40–150)
ALT SERPL W P-5'-P-CCNC: 20 U/L (ref 0–50)
ANION GAP SERPL CALCULATED.3IONS-SCNC: 5 MMOL/L (ref 3–14)
AST SERPL W P-5'-P-CCNC: 22 U/L (ref 0–45)
BASOPHILS # BLD AUTO: 0 10E3/UL (ref 0–0.2)
BASOPHILS NFR BLD AUTO: 1 %
BILIRUB DIRECT SERPL-MCNC: 0.1 MG/DL (ref 0–0.2)
BILIRUB SERPL-MCNC: 0.3 MG/DL (ref 0.2–1.3)
BUN SERPL-MCNC: 7 MG/DL (ref 7–30)
CALCIUM SERPL-MCNC: 9.2 MG/DL (ref 8.5–10.1)
CHLORIDE BLD-SCNC: 105 MMOL/L (ref 94–109)
CO2 SERPL-SCNC: 28 MMOL/L (ref 20–32)
CREAT SERPL-MCNC: 0.92 MG/DL (ref 0.52–1.04)
EOSINOPHIL # BLD AUTO: 0.1 10E3/UL (ref 0–0.7)
EOSINOPHIL NFR BLD AUTO: 1 %
ERYTHROCYTE [DISTWIDTH] IN BLOOD BY AUTOMATED COUNT: 13.2 % (ref 10–15)
ERYTHROCYTE [DISTWIDTH] IN BLOOD BY AUTOMATED COUNT: 13.3 % (ref 10–15)
GFR SERPL CREATININE-BSD FRML MDRD: 76 ML/MIN/1.73M2
GLUCOSE BLD-MCNC: 94 MG/DL (ref 70–99)
HCG SERPL QL: NEGATIVE
HCT VFR BLD AUTO: 39.1 % (ref 35–47)
HCT VFR BLD AUTO: 43.5 % (ref 35–47)
HGB BLD-MCNC: 12.4 G/DL (ref 11.7–15.7)
HGB BLD-MCNC: 13.6 G/DL (ref 11.7–15.7)
HOLD SPECIMEN: NORMAL
IMM GRANULOCYTES # BLD: 0 10E3/UL
IMM GRANULOCYTES NFR BLD: 0 %
LIPASE SERPL-CCNC: 104 U/L (ref 73–393)
LYMPHOCYTES # BLD AUTO: 2.1 10E3/UL (ref 0.8–5.3)
LYMPHOCYTES NFR BLD AUTO: 36 %
MCH RBC QN AUTO: 27.2 PG (ref 26.5–33)
MCH RBC QN AUTO: 27.7 PG (ref 26.5–33)
MCHC RBC AUTO-ENTMCNC: 31.3 G/DL (ref 31.5–36.5)
MCHC RBC AUTO-ENTMCNC: 31.7 G/DL (ref 31.5–36.5)
MCV RBC AUTO: 87 FL (ref 78–100)
MCV RBC AUTO: 87 FL (ref 78–100)
MONOCYTES # BLD AUTO: 0.6 10E3/UL (ref 0–1.3)
MONOCYTES NFR BLD AUTO: 10 %
NEUTROPHILS # BLD AUTO: 3.1 10E3/UL (ref 1.6–8.3)
NEUTROPHILS NFR BLD AUTO: 52 %
NRBC # BLD AUTO: 0 10E3/UL
NRBC # BLD AUTO: 0 10E3/UL
NRBC BLD AUTO-RTO: 0 /100
NRBC BLD AUTO-RTO: 0 /100
PLATELET # BLD AUTO: 233 10E3/UL (ref 150–450)
PLATELET # BLD AUTO: ABNORMAL 10*3/UL
POTASSIUM BLD-SCNC: 4 MMOL/L (ref 3.4–5.3)
PROT SERPL-MCNC: 7.9 G/DL (ref 6.8–8.8)
RBC # BLD AUTO: 4.48 10E6/UL (ref 3.8–5.2)
RBC # BLD AUTO: 5 10E6/UL (ref 3.8–5.2)
SODIUM SERPL-SCNC: 138 MMOL/L (ref 133–144)
WBC # BLD AUTO: 5.6 10E3/UL (ref 4–11)
WBC # BLD AUTO: 5.8 10E3/UL (ref 4–11)

## 2021-08-15 PROCEDURE — 96374 THER/PROPH/DIAG INJ IV PUSH: CPT | Mod: 59

## 2021-08-15 PROCEDURE — 250N000009 HC RX 250: Performed by: EMERGENCY MEDICINE

## 2021-08-15 PROCEDURE — 85025 COMPLETE CBC W/AUTO DIFF WBC: CPT | Performed by: EMERGENCY MEDICINE

## 2021-08-15 PROCEDURE — 250N000011 HC RX IP 250 OP 636: Performed by: EMERGENCY MEDICINE

## 2021-08-15 PROCEDURE — 84703 CHORIONIC GONADOTROPIN ASSAY: CPT | Performed by: EMERGENCY MEDICINE

## 2021-08-15 PROCEDURE — 74177 CT ABD & PELVIS W/CONTRAST: CPT

## 2021-08-15 PROCEDURE — 96361 HYDRATE IV INFUSION ADD-ON: CPT

## 2021-08-15 PROCEDURE — 83690 ASSAY OF LIPASE: CPT | Performed by: EMERGENCY MEDICINE

## 2021-08-15 PROCEDURE — 36415 COLL VENOUS BLD VENIPUNCTURE: CPT | Performed by: EMERGENCY MEDICINE

## 2021-08-15 PROCEDURE — 99285 EMERGENCY DEPT VISIT HI MDM: CPT | Mod: 25

## 2021-08-15 PROCEDURE — 258N000003 HC RX IP 258 OP 636: Performed by: EMERGENCY MEDICINE

## 2021-08-15 PROCEDURE — 82040 ASSAY OF SERUM ALBUMIN: CPT | Performed by: EMERGENCY MEDICINE

## 2021-08-15 PROCEDURE — 80048 BASIC METABOLIC PNL TOTAL CA: CPT | Performed by: EMERGENCY MEDICINE

## 2021-08-15 RX ORDER — ONDANSETRON 2 MG/ML
4 INJECTION INTRAMUSCULAR; INTRAVENOUS EVERY 30 MIN PRN
Status: DISCONTINUED | OUTPATIENT
Start: 2021-08-15 | End: 2021-08-15 | Stop reason: HOSPADM

## 2021-08-15 RX ORDER — IOPAMIDOL 755 MG/ML
500 INJECTION, SOLUTION INTRAVASCULAR ONCE
Status: COMPLETED | OUTPATIENT
Start: 2021-08-15 | End: 2021-08-15

## 2021-08-15 RX ORDER — ONDANSETRON 4 MG/1
4 TABLET, ORALLY DISINTEGRATING ORAL EVERY 8 HOURS PRN
Qty: 10 TABLET | Refills: 0 | Status: SHIPPED | OUTPATIENT
Start: 2021-08-15

## 2021-08-15 RX ORDER — SODIUM CHLORIDE 9 MG/ML
INJECTION, SOLUTION INTRAVENOUS CONTINUOUS
Status: DISCONTINUED | OUTPATIENT
Start: 2021-08-15 | End: 2021-08-15 | Stop reason: HOSPADM

## 2021-08-15 RX ADMIN — IOPAMIDOL 100 ML: 755 INJECTION, SOLUTION INTRAVENOUS at 15:31

## 2021-08-15 RX ADMIN — SODIUM CHLORIDE 1000 ML: 9 INJECTION, SOLUTION INTRAVENOUS at 14:35

## 2021-08-15 RX ADMIN — ONDANSETRON 4 MG: 2 INJECTION INTRAMUSCULAR; INTRAVENOUS at 16:35

## 2021-08-15 RX ADMIN — SODIUM CHLORIDE 65 ML: 9 INJECTION, SOLUTION INTRAVENOUS at 15:32

## 2021-08-15 ASSESSMENT — ENCOUNTER SYMPTOMS
VOMITING: 0
ABDOMINAL PAIN: 1
DIARRHEA: 1
FEVER: 0
APPETITE CHANGE: 1
NAUSEA: 1

## 2021-08-15 NOTE — ED TRIAGE NOTES
Pt arrives via EMS with c/o a couple days of abdominal pain, diarrhea, and nausea. Pt reports symptoms that started at noon yesterday. Pt also endorses chills. Pt reports treatment for UTI last month. Today pt reports she was feeling better so tried to eat some crackers, applesauce and water but then the diarrhea, pain, and nausea returned. Pt denies vomiting. Pt endorses history of diverticulitis and non insulin dependent diabetes. Pt is vaccinated for COVID. ABCs intact.

## 2021-08-15 NOTE — ED PROVIDER NOTES
History   Chief Complaint:  Abdominal Pain      HPI   Teresa Sam is a 46 year old female with a history of type II diabetes and diverticulitis who presents via EMS for evaluation of abdominal pain. Yesterday the patient started to develop primarily lower abdominal pain with associated nausea and diarrhea. Since then she has had a reduced appetite. She reports that she had approximately five episodes of diarrhea yesterday and three today. Today after trying to eat some crackers and applesauce her pain worsened, prompting her to call EMS to bring her into the ED for evaluation. She has not had any fever or vomiting. She notes that she was recently treated for a UTI with a course of antibiotics.     Review of Systems   Constitutional: Positive for appetite change. Negative for fever.   Gastrointestinal: Positive for abdominal pain, diarrhea and nausea. Negative for vomiting.   All other systems reviewed and are negative.    Allergies:  Citalopram  Warfarin     Medications:  Aspirin   Metformin  Ranitidine   Zoloft     Past Medical History:    Arthritis  DVT   Depression  Sleep apnea   GERD   PCOS  Diverticulitis      Past Surgical History:    Cholecystectomy  Gyn surgery    Ovarian cyst removal  Dilation and curettage   [place] teeth extraction     Family history:  Hyperlipidemia - Mother   Hypertension - Mother      Social History:  The patient presents to the ED via EMS accompanied by a family member.     Physical Exam     Patient Vitals for the past 24 hrs:   BP Temp Temp src Pulse Resp SpO2   08/15/21 1415 -- -- -- -- -- 100 %   08/15/21 1400 -- -- -- -- -- 99 %   08/15/21 1345 132/78 -- -- -- -- 98 %   08/15/21 1342 132/78 98.2  F (36.8  C) Oral 73 16 --       Physical Exam  General: Patient is alert and interactive when I enter the room  Head:  The scalp, face, and head appear normal  Eyes:  Conjunctivae are normal  ENT:    The nose is normal    Pinnae are normal    External acoustic canals are  normal  Neck:  Trachea midline  CV:  Pulses are normal  Resp:  No respiratory distress   Abdomen:      Soft, LLQ tenderness, non-distended  Musc:  Normal muscular tone    No major joint effusions    No asymmetric leg swelling  Skin:  No rash or lesions noted  Neuro:  Speech is normal and fluent. Face is symmetric.     Moving all extremities well.   Psych: Awake. Alert.  Normal affect.  Appropriate interactions.     Emergency Department Course     Imaging:  CT Abdomen Pelvis w Contrast:  IMPRESSION:   1.  Fibroid uterus with an indwelling IUD. No pelvic mass or free fluid otherwise evident.   2.  No bowel obstruction or diverticulitis. Appendix is normal.   Per radiology.     Laboratory:   CBC (1426): WBC 5.6, HGB 13.6  CBC (1551): WBC 5.8, HGB 12.4,    BMP: WNL (Creatinine 0.92)    Hepatic panel: WNL   Lipase: 104   HCG qualitative Pregnancy Serum: Negative     Emergency Department Course:  Reviewed:  I reviewed nursing notes, vitals and past medical history    Assessments:  1411: I obtained history and examined the patient as noted above.     1623: I updated and reassessed the patient.     Interventions:  1435 NS 1,000 mL IV     Disposition:  The patient was discharged to home.      Impression & Plan     Medical Decision Making:   Teresa Sam is a 46 year old female who presents with abdominal pain and diarrhea.  She looks overall well and without a concerning etiology of abdominal pain.  A broad differential diagnosis was of course considered.  The workup in the ED is at this point negative.  No etiology for the patients pain is found at this point and my suspicion of an intraabdominal catastrophe or other worrisome etiology is very low. CT and lab work are reassuring. Will give stool kit for discharge if diarrhea continues.      Diagnosis:    ICD-10-CM    1. Diarrhea, unspecified type  R19.7 Enteric Bacteria and Virus Panel by [facility] Stool      Discharge Medications:  New Prescriptions    ONDANSETRON  (ZOFRAN ODT) 4 MG ODT TAB    Take 1 tablet (4 mg) by mouth every 8 hours as needed for nausea        Scribe Disclosure:  I, Bk Miguel, am serving as a scribe at 2:11 PM on 8/15/2021 to document services personally performed by Manda Lux MD based on my observations and the provider's statements to me.         Manda Lux MD  08/15/21 [id number]

## 2021-12-04 ENCOUNTER — HEALTH MAINTENANCE LETTER (OUTPATIENT)
Age: 46
End: 2021-12-04

## 2022-09-18 ENCOUNTER — HEALTH MAINTENANCE LETTER (OUTPATIENT)
Age: 47
End: 2022-09-18

## 2023-01-28 ENCOUNTER — HEALTH MAINTENANCE LETTER (OUTPATIENT)
Age: 48
End: 2023-01-28

## 2023-10-08 ENCOUNTER — HEALTH MAINTENANCE LETTER (OUTPATIENT)
Age: 48
End: 2023-10-08

## 2024-02-25 ENCOUNTER — HEALTH MAINTENANCE LETTER (OUTPATIENT)
Age: 49
End: 2024-02-25